# Patient Record
Sex: MALE | URBAN - METROPOLITAN AREA
[De-identification: names, ages, dates, MRNs, and addresses within clinical notes are randomized per-mention and may not be internally consistent; named-entity substitution may affect disease eponyms.]

---

## 2020-06-03 ENCOUNTER — OFFICE VISIT (OUTPATIENT)
Dept: PRIMARY CARE CLINIC | Age: 30
End: 2020-06-03

## 2020-06-03 VITALS — TEMPERATURE: 98.6 F | OXYGEN SATURATION: 98 % | HEART RATE: 103 BPM

## 2020-06-03 DIAGNOSIS — Z20.822 EXPOSURE TO COVID-19 VIRUS: Primary | ICD-10-CM

## 2020-06-03 NOTE — PROGRESS NOTES
Patient is being seen at the [facility]. Please see scanned documentation as well for further information. S:  Mr. Estella Pulliam presents for Covid testing due to exposure to an individual positive for Covid 19. Patient exposed by a friend that was around someone that had covid 23. Patient denies current Covid type symptoms. Desires testing but denies cough/ cold, fever/ chills, gi symptoms, ha.       O:    Visit Vitals  Pulse (!) 103   Temp 98.6 °F (37 °C) (Oral)   SpO2 98%     Alert and oriented  No acute distress, no increased work of breathing  Normocephalic, atraumatic  Skin color normal  Calm and cooperative  Voice clear, conversant without shortness of breath  Conjunctiva normal    A/P:  Exposure to Covid 19    Covid 19 testing performed  Patient understands he will be contacted with the results. Monitor for development of symptoms and follow up prn with primary care provider.

## 2020-06-04 LAB — SARS-COV-2, NAA: NOT DETECTED

## 2020-06-09 NOTE — PROGRESS NOTES
[phone] (home)   Spoke to patient Identified pt with two pt identifiers (Name @ )  Notified patient of his results and understand